# Patient Record
Sex: FEMALE | Race: WHITE | URBAN - METROPOLITAN AREA
[De-identification: names, ages, dates, MRNs, and addresses within clinical notes are randomized per-mention and may not be internally consistent; named-entity substitution may affect disease eponyms.]

---

## 2017-01-10 PROBLEM — K70.31 ASCITES DUE TO ALCOHOLIC CIRRHOSIS (HCC): Status: ACTIVE | Noted: 2017-01-10

## 2017-01-10 PROBLEM — I85.11 SECONDARY ESOPHAGEAL VARICES WITH BLEEDING (HCC): Status: ACTIVE | Noted: 2017-01-10

## 2017-01-10 PROBLEM — K70.31 ALCOHOLIC CIRRHOSIS OF LIVER WITH ASCITES (HCC): Status: ACTIVE | Noted: 2017-01-10

## 2017-04-01 PROCEDURE — 82140 ASSAY OF AMMONIA: CPT | Performed by: SPECIALIST

## 2019-02-12 ENCOUNTER — APPOINTMENT (OUTPATIENT)
Dept: GENERAL RADIOLOGY | Facility: HOSPITAL | Age: 44
DRG: 434 | End: 2019-02-12
Attending: INTERNAL MEDICINE
Payer: COMMERCIAL

## 2019-02-12 ENCOUNTER — HOSPITAL ENCOUNTER (INPATIENT)
Facility: HOSPITAL | Age: 44
LOS: 1 days | Discharge: HOME | DRG: 434 | End: 2019-02-13
Attending: INTERNAL MEDICINE | Admitting: INTERNAL MEDICINE
Payer: COMMERCIAL

## 2019-02-12 PROBLEM — K76.82 HEPATIC ENCEPHALOPATHY (HCC): Status: ACTIVE | Noted: 2019-02-12

## 2019-02-12 PROBLEM — K72.90 HEPATIC ENCEPHALOPATHY (HCC): Status: ACTIVE | Noted: 2019-02-12

## 2019-02-12 LAB
ALBUMIN SERPL-MCNC: 2.4 G/DL (ref 3.4–5)
ALBUMIN/GLOB SERPL: 0.5 {RATIO} (ref 1–2)
ALP LIVER SERPL-CCNC: 101 U/L (ref 37–98)
ALT SERPL-CCNC: 36 U/L (ref 13–56)
AMMONIA PLAS-MCNC: 38 UMOL/L (ref 11–32)
ANION GAP SERPL CALC-SCNC: 11 MMOL/L (ref 0–18)
AST SERPL-CCNC: 56 U/L (ref 15–37)
BASOPHILS # BLD AUTO: 0.04 X10(3) UL (ref 0–0.2)
BASOPHILS NFR BLD AUTO: 0.8 %
BILIRUB SERPL-MCNC: 4.4 MG/DL (ref 0.1–2)
BILIRUB UR QL STRIP.AUTO: NEGATIVE
BUN BLD-MCNC: 15 MG/DL (ref 7–18)
BUN/CREAT SERPL: 18.5 (ref 10–20)
CALCIUM BLD-MCNC: 9.2 MG/DL (ref 8.5–10.1)
CHLORIDE SERPL-SCNC: 115 MMOL/L (ref 98–107)
CLARITY UR REFRACT.AUTO: CLEAR
CO2 SERPL-SCNC: 19 MMOL/L (ref 21–32)
COLOR UR AUTO: YELLOW
CREAT BLD-MCNC: 0.81 MG/DL (ref 0.55–1.02)
DEPRECATED RDW RBC AUTO: 44.4 FL (ref 35.1–46.3)
EOSINOPHIL # BLD AUTO: 0.27 X10(3) UL (ref 0–0.7)
EOSINOPHIL NFR BLD AUTO: 5.2 %
ERYTHROCYTE [DISTWIDTH] IN BLOOD BY AUTOMATED COUNT: 13.1 % (ref 11–15)
GLOBULIN PLAS-MCNC: 4.6 G/DL (ref 2.8–4.4)
GLUCOSE BLD-MCNC: 104 MG/DL (ref 70–99)
GLUCOSE UR STRIP.AUTO-MCNC: NEGATIVE MG/DL
HCT VFR BLD AUTO: 32 % (ref 35–48)
HGB BLD-MCNC: 10.5 G/DL (ref 12–16)
IMM GRANULOCYTES # BLD AUTO: 0.03 X10(3) UL (ref 0–1)
IMM GRANULOCYTES NFR BLD: 0.6 %
INR BLD: 1.67 (ref 0.9–1.1)
KETONES UR STRIP.AUTO-MCNC: NEGATIVE MG/DL
LYMPHOCYTES # BLD AUTO: 1.28 X10(3) UL (ref 1–4)
LYMPHOCYTES NFR BLD AUTO: 24.6 %
M PROTEIN MFR SERPL ELPH: 7 G/DL (ref 6.4–8.2)
MCH RBC QN AUTO: 30.5 PG (ref 26–34)
MCHC RBC AUTO-ENTMCNC: 32.8 G/DL (ref 31–37)
MCV RBC AUTO: 93 FL (ref 80–100)
MONOCYTES # BLD AUTO: 0.44 X10(3) UL (ref 0.1–1)
MONOCYTES NFR BLD AUTO: 8.5 %
NEUTROPHILS # BLD AUTO: 3.14 X10 (3) UL (ref 1.5–7.7)
NEUTROPHILS # BLD AUTO: 3.14 X10(3) UL (ref 1.5–7.7)
NEUTROPHILS NFR BLD AUTO: 60.3 %
NITRITE UR QL STRIP.AUTO: NEGATIVE
OSMOLALITY SERPL CALC.SUM OF ELEC: 301 MOSM/KG (ref 275–295)
PH UR STRIP.AUTO: 6 [PH] (ref 4.5–8)
PLATELET # BLD AUTO: 126 10(3)UL (ref 150–450)
POTASSIUM SERPL-SCNC: 3.7 MMOL/L (ref 3.5–5.1)
PROT UR STRIP.AUTO-MCNC: NEGATIVE MG/DL
PSA SERPL DL<=0.01 NG/ML-MCNC: 20.3 SECONDS (ref 12.4–14.7)
RBC # BLD AUTO: 3.44 X10(6)UL (ref 3.8–5.3)
RBC UR QL AUTO: NEGATIVE
SODIUM SERPL-SCNC: 145 MMOL/L (ref 136–145)
SP GR UR STRIP.AUTO: 1.01 (ref 1–1.03)
UROBILINOGEN UR STRIP.AUTO-MCNC: <2 MG/DL
WBC # BLD AUTO: 5.2 X10(3) UL (ref 4–11)

## 2019-02-12 PROCEDURE — 80053 COMPREHEN METABOLIC PANEL: CPT | Performed by: INTERNAL MEDICINE

## 2019-02-12 PROCEDURE — 87086 URINE CULTURE/COLONY COUNT: CPT | Performed by: INTERNAL MEDICINE

## 2019-02-12 PROCEDURE — 82140 ASSAY OF AMMONIA: CPT | Performed by: INTERNAL MEDICINE

## 2019-02-12 PROCEDURE — 81001 URINALYSIS AUTO W/SCOPE: CPT | Performed by: INTERNAL MEDICINE

## 2019-02-12 PROCEDURE — 85025 COMPLETE CBC W/AUTO DIFF WBC: CPT | Performed by: INTERNAL MEDICINE

## 2019-02-12 PROCEDURE — 85610 PROTHROMBIN TIME: CPT | Performed by: INTERNAL MEDICINE

## 2019-02-12 PROCEDURE — 71045 X-RAY EXAM CHEST 1 VIEW: CPT | Performed by: INTERNAL MEDICINE

## 2019-02-12 RX ORDER — LACTULOSE 10 G/15ML
20 SOLUTION ORAL 2 TIMES DAILY
Status: ON HOLD | COMMUNITY
End: 2019-02-13

## 2019-02-12 RX ORDER — FOLIC ACID 1 MG/1
1 TABLET ORAL
Status: DISCONTINUED | OUTPATIENT
Start: 2019-02-12 | End: 2019-02-13

## 2019-02-12 RX ORDER — ONDANSETRON 2 MG/ML
4 INJECTION INTRAMUSCULAR; INTRAVENOUS EVERY 6 HOURS PRN
Status: DISCONTINUED | OUTPATIENT
Start: 2019-02-12 | End: 2019-02-13

## 2019-02-12 RX ORDER — PANTOPRAZOLE SODIUM 40 MG/1
40 TABLET, DELAYED RELEASE ORAL
Status: DISCONTINUED | OUTPATIENT
Start: 2019-02-12 | End: 2019-02-13

## 2019-02-12 RX ORDER — LACTULOSE 20 G/30ML
20 SOLUTION ORAL 4 TIMES DAILY
Status: DISCONTINUED | OUTPATIENT
Start: 2019-02-12 | End: 2019-02-13

## 2019-02-12 RX ORDER — LACTULOSE 20 G/30ML
20 SOLUTION ORAL 3 TIMES DAILY
Status: DISCONTINUED | OUTPATIENT
Start: 2019-02-12 | End: 2019-02-12

## 2019-02-12 RX ORDER — HEPARIN SODIUM 5000 [USP'U]/ML
5000 INJECTION, SOLUTION INTRAVENOUS; SUBCUTANEOUS EVERY 8 HOURS SCHEDULED
Status: DISCONTINUED | OUTPATIENT
Start: 2019-02-13 | End: 2019-02-13

## 2019-02-12 RX ORDER — FUROSEMIDE 20 MG/1
20 TABLET ORAL
Status: DISCONTINUED | OUTPATIENT
Start: 2019-02-12 | End: 2019-02-13

## 2019-02-12 RX ORDER — METOCLOPRAMIDE HYDROCHLORIDE 5 MG/ML
10 INJECTION INTRAMUSCULAR; INTRAVENOUS EVERY 8 HOURS PRN
Status: DISCONTINUED | OUTPATIENT
Start: 2019-02-12 | End: 2019-02-13

## 2019-02-12 NOTE — H&P
B[facility] Hospitalist H&P       CC: Hepatic encephalopathy     PCP: No primary care provider on file.     History of Present Illness:  Ms. Karsten Hernández is a 38 yo female with PMH of ETOH cirrhosis s/p TIPS 2014 and revision in 2017 who presented as transfer from [facility] E lb (56.7 kg)  04/03/17 : 126 lb (57.2 kg)  01/10/17 : 128 lb (58.1 kg)  11/02/16 : 127 lb 6.4 oz (57.8 kg)  02/04/14 : 120 lb (54.4 kg)    Gen: No acute distress, alert and oriented   HEENT: sclera icterus, oral mucosa moist  Pulm: Lungs clear bilaterally, Hospitalist

## 2019-02-13 VITALS
WEIGHT: 115 LBS | TEMPERATURE: 98 F | OXYGEN SATURATION: 100 % | HEART RATE: 100 BPM | BODY MASS INDEX: 18 KG/M2 | DIASTOLIC BLOOD PRESSURE: 67 MMHG | RESPIRATION RATE: 16 BRPM | SYSTOLIC BLOOD PRESSURE: 125 MMHG

## 2019-02-13 LAB
ALBUMIN SERPL-MCNC: 2.1 G/DL (ref 3.4–5)
ALBUMIN/GLOB SERPL: 0.5 {RATIO} (ref 1–2)
ALP LIVER SERPL-CCNC: 92 U/L (ref 37–98)
ALT SERPL-CCNC: 34 U/L (ref 13–56)
ANION GAP SERPL CALC-SCNC: 8 MMOL/L (ref 0–18)
AST SERPL-CCNC: 51 U/L (ref 15–37)
BASOPHILS # BLD AUTO: 0.04 X10(3) UL (ref 0–0.2)
BASOPHILS NFR BLD AUTO: 0.7 %
BILIRUB SERPL-MCNC: 4.1 MG/DL (ref 0.1–2)
BUN BLD-MCNC: 13 MG/DL (ref 7–18)
BUN/CREAT SERPL: 17.8 (ref 10–20)
CALCIUM BLD-MCNC: 8.8 MG/DL (ref 8.5–10.1)
CHLORIDE SERPL-SCNC: 115 MMOL/L (ref 98–107)
CO2 SERPL-SCNC: 20 MMOL/L (ref 21–32)
CREAT BLD-MCNC: 0.73 MG/DL (ref 0.55–1.02)
DEPRECATED RDW RBC AUTO: 43.8 FL (ref 35.1–46.3)
EOSINOPHIL # BLD AUTO: 0.21 X10(3) UL (ref 0–0.7)
EOSINOPHIL NFR BLD AUTO: 3.8 %
ERYTHROCYTE [DISTWIDTH] IN BLOOD BY AUTOMATED COUNT: 13.1 % (ref 11–15)
GLOBULIN PLAS-MCNC: 4 G/DL (ref 2.8–4.4)
GLUCOSE BLD-MCNC: 90 MG/DL (ref 70–99)
HCT VFR BLD AUTO: 29.3 % (ref 35–48)
HGB BLD-MCNC: 10 G/DL (ref 12–16)
IMM GRANULOCYTES # BLD AUTO: 0.02 X10(3) UL (ref 0–1)
IMM GRANULOCYTES NFR BLD: 0.4 %
LYMPHOCYTES # BLD AUTO: 1.84 X10(3) UL (ref 1–4)
LYMPHOCYTES NFR BLD AUTO: 33 %
M PROTEIN MFR SERPL ELPH: 6.1 G/DL (ref 6.4–8.2)
MCH RBC QN AUTO: 31.4 PG (ref 26–34)
MCHC RBC AUTO-ENTMCNC: 34.1 G/DL (ref 31–37)
MCV RBC AUTO: 92.1 FL (ref 80–100)
MONOCYTES # BLD AUTO: 0.76 X10(3) UL (ref 0.1–1)
MONOCYTES NFR BLD AUTO: 13.6 %
NEUTROPHILS # BLD AUTO: 2.7 X10 (3) UL (ref 1.5–7.7)
NEUTROPHILS # BLD AUTO: 2.7 X10(3) UL (ref 1.5–7.7)
NEUTROPHILS NFR BLD AUTO: 48.5 %
OSMOLALITY SERPL CALC.SUM OF ELEC: 296 MOSM/KG (ref 275–295)
PLATELET # BLD AUTO: 104 10(3)UL (ref 150–450)
POTASSIUM SERPL-SCNC: 3.5 MMOL/L (ref 3.5–5.1)
POTASSIUM SERPL-SCNC: 3.9 MMOL/L (ref 3.5–5.1)
RBC # BLD AUTO: 3.18 X10(6)UL (ref 3.8–5.3)
SODIUM SERPL-SCNC: 143 MMOL/L (ref 136–145)
WBC # BLD AUTO: 5.6 X10(3) UL (ref 4–11)

## 2019-02-13 PROCEDURE — 85025 COMPLETE CBC W/AUTO DIFF WBC: CPT | Performed by: INTERNAL MEDICINE

## 2019-02-13 PROCEDURE — 80053 COMPREHEN METABOLIC PANEL: CPT | Performed by: INTERNAL MEDICINE

## 2019-02-13 PROCEDURE — 84132 ASSAY OF SERUM POTASSIUM: CPT | Performed by: INTERNAL MEDICINE

## 2019-02-13 RX ORDER — LACTULOSE 10 G/15ML
20 SOLUTION ORAL 2 TIMES DAILY
Qty: 1892 ML | Refills: 0 | Status: SHIPPED | OUTPATIENT
Start: 2019-02-13 | End: 2019-02-13

## 2019-02-13 RX ORDER — POTASSIUM CHLORIDE 20 MEQ/1
40 TABLET, EXTENDED RELEASE ORAL EVERY 4 HOURS
Status: COMPLETED | OUTPATIENT
Start: 2019-02-13 | End: 2019-02-13

## 2019-02-13 RX ORDER — LACTULOSE 10 G/15ML
20 SOLUTION ORAL 2 TIMES DAILY
Qty: 1892 ML | Refills: 0 | Status: SHIPPED | OUTPATIENT
Start: 2019-02-13 | End: 2019-09-30

## 2019-02-13 NOTE — PLAN OF CARE
Patient/Family Goals    • Patient/Family Long Term Goal Progressing    • Patient/Family Short Term Goal Progressing        Transferred from [facility] due to being out of network. A/O x 4. No confusion noted. RA.  NSR  tele. Electrolyte protocol. Jaundice.  L

## 2019-02-13 NOTE — PLAN OF CARE
A/O x4. VSS. Room air.   No confusion or complaints  Jaundice noted  On lactulose QID  Restarted on Rifaxamin  Electrolyte protocol  Denies pain  Needs met at this time  Will continue to monitor    GASTROINTESTINAL - ADULT    • Minimal or absence of nausea

## 2019-02-13 NOTE — CM/SW NOTE
02/13/19 1300   CM/SW Referral Data   Referral Source Physician;Social Work (self-referral)   Reason for Referral Discharge planning   Informant Patient   Patient Info   Patient's Mental Status Alert;Oriented       MSW spoke to patient, she denies any i

## 2019-02-13 NOTE — CONSULTS
Referring Provider  None Pcp    Chief Complaint: No chief complaint on file. HPI:   Bob Rubio is a 39year old female, with h/o alcoholic cirrhosis, Current MELD 18,. Tells me she has been sober, but her AST level remains elevated.  She tells me that (52.2 kg)   LMP 07/12/2018   SpO2 100%   BMI 17.75 kg/m²     GENERAL: Thinly built, poor nutrition. SKIN: no rashes,no suspicious lesions  HEENT: atraumatic, normocephalic. NECK: supple,no adenopathy,no bruits  LUNGS: Chest wall movements are normal B/L.

## 2019-02-13 NOTE — DISCHARGE SUMMARY
General Medicine Discharge Summary     Patient ID:  Bijan Palomino  39year old  8/7/1975    Admit date: 2/12/2019    Discharge date and time: 2/13/19    Attending Physician: Rocky Hernandez MD     Primary Care Physician: None Pcp     Reason for admission daily.    rifaximin (XIFAXAN) 550 MG Oral Tab  Take 1 tablet (550 mg total) by mouth 2 (two) times daily. CONTINUE these medications which have NOT CHANGED    folic acid 1 MG Oral Tab  Take 1 tablet (1 mg total) by mouth once daily.     PANTOPRAZOLE SO

## 2019-02-13 NOTE — CM/SW NOTE
Informed by patient's nurse that patient is requesting any coupons for her rifaximin--information on instant savings card to be activated or downloaded given to pt as well as draft letter for her physician to send to her insurance on behalf of medical vinicio

## 2019-02-13 NOTE — PAYOR COMM NOTE
--------------  ADMISSION REVIEW     Payor: [employer] ALBA/LAINEY  Subscriber #:  [id number]  Authorization Number: [id number]    Admit date: 2/12/19  Admit time: 46       Admitting Physician: Stefany Fonseca MD  Attending Physician:  Stefany Fonseca MD 29.3    RBC 3.18      02/13/19 0139 97.9 °F (36.6 °C) 100 16 125/67 100 % — None (Room air) — AR   02/12/19 2033 97.9 °F (36.6 °C) 97 16 145/86 — — — — AR     GI CONSULT PENDING      PLEASE PROVIDE INPATIENT AUTHORIZATION. THANK YOU.

## 2019-02-14 NOTE — PROGRESS NOTES
NURSING DISCHARGE NOTE    Discharged Home via Ambulatory. Accompanied by Friend  Belongings Taken by patient/family. Pt assessed and ready for discharge. Instructions and medications reviewed with patient.   Paper prescriptions given per pt request

## 2020-02-11 PROCEDURE — 88342 IMHCHEM/IMCYTCHM 1ST ANTB: CPT | Performed by: OBSTETRICS & GYNECOLOGY

## 2020-02-11 PROCEDURE — 88305 TISSUE EXAM BY PATHOLOGIST: CPT | Performed by: OBSTETRICS & GYNECOLOGY

## 2022-10-26 ENCOUNTER — TELEPHONE (OUTPATIENT)
Dept: SCHEDULING | Age: 47
End: 2022-10-26

## 2025-02-26 ENCOUNTER — LABORATORY ENCOUNTER (OUTPATIENT)
Dept: LAB | Age: 50
End: 2025-02-26
Attending: ORTHOPAEDIC SURGERY
Payer: COMMERCIAL

## 2025-02-26 DIAGNOSIS — Z01.818 PRE-OP TESTING: ICD-10-CM

## 2025-02-26 LAB
ALBUMIN SERPL-MCNC: 3.5 G/DL (ref 3.2–4.8)
ALBUMIN/GLOB SERPL: 0.9 {RATIO} (ref 1–2)
ALP LIVER SERPL-CCNC: 86 U/L
ALT SERPL-CCNC: 33 U/L
ANION GAP SERPL CALC-SCNC: 9 MMOL/L (ref 0–18)
AST SERPL-CCNC: 59 U/L (ref ?–34)
BILIRUB SERPL-MCNC: 3.1 MG/DL (ref 0.3–1.2)
BUN BLD-MCNC: 12 MG/DL (ref 9–23)
BUN/CREAT SERPL: 14.1 (ref 10–20)
CALCIUM BLD-MCNC: 9.6 MG/DL (ref 8.7–10.4)
CHLORIDE SERPL-SCNC: 106 MMOL/L (ref 98–112)
CO2 SERPL-SCNC: 24 MMOL/L (ref 21–32)
CREAT BLD-MCNC: 0.85 MG/DL
EGFRCR SERPLBLD CKD-EPI 2021: 83 ML/MIN/1.73M2 (ref 60–?)
FASTING STATUS PATIENT QL REPORTED: YES
GLOBULIN PLAS-MCNC: 3.7 G/DL (ref 2–3.5)
GLUCOSE BLD-MCNC: 100 MG/DL (ref 70–99)
OSMOLALITY SERPL CALC.SUM OF ELEC: 288 MOSM/KG (ref 275–295)
POTASSIUM SERPL-SCNC: 4 MMOL/L (ref 3.5–5.1)
PROT SERPL-MCNC: 7.2 G/DL (ref 5.7–8.2)
SODIUM SERPL-SCNC: 139 MMOL/L (ref 136–145)

## 2025-02-26 PROCEDURE — 80053 COMPREHEN METABOLIC PANEL: CPT

## 2025-02-26 PROCEDURE — 36415 COLL VENOUS BLD VENIPUNCTURE: CPT

## 2025-02-26 RX ORDER — GABAPENTIN 300 MG/1
300 CAPSULE ORAL AS NEEDED
COMMUNITY

## 2025-02-26 RX ORDER — HYDROCODONE BITARTRATE AND ACETAMINOPHEN 5; 325 MG/1; MG/1
1 TABLET ORAL EVERY 6 HOURS PRN
Status: ON HOLD | COMMUNITY
End: 2025-02-28

## 2025-02-26 RX ORDER — SPIRONOLACTONE 25 MG/1
50 TABLET ORAL DAILY
COMMUNITY

## 2025-02-28 ENCOUNTER — APPOINTMENT (OUTPATIENT)
Dept: GENERAL RADIOLOGY | Facility: HOSPITAL | Age: 50
End: 2025-02-28
Attending: ORTHOPAEDIC SURGERY
Payer: COMMERCIAL

## 2025-02-28 ENCOUNTER — ANESTHESIA (OUTPATIENT)
Dept: SURGERY | Facility: HOSPITAL | Age: 50
End: 2025-02-28
Payer: COMMERCIAL

## 2025-02-28 ENCOUNTER — HOSPITAL ENCOUNTER (OUTPATIENT)
Facility: HOSPITAL | Age: 50
Setting detail: HOSPITAL OUTPATIENT SURGERY
Discharge: HOME | End: 2025-02-28
Attending: ORTHOPAEDIC SURGERY | Admitting: ORTHOPAEDIC SURGERY
Payer: COMMERCIAL

## 2025-02-28 ENCOUNTER — ANESTHESIA EVENT (OUTPATIENT)
Dept: SURGERY | Facility: HOSPITAL | Age: 50
End: 2025-02-28
Payer: COMMERCIAL

## 2025-02-28 VITALS
HEIGHT: 67 IN | SYSTOLIC BLOOD PRESSURE: 124 MMHG | HEART RATE: 91 BPM | RESPIRATION RATE: 18 BRPM | DIASTOLIC BLOOD PRESSURE: 75 MMHG | OXYGEN SATURATION: 100 % | TEMPERATURE: 98 F | BODY MASS INDEX: 20.56 KG/M2 | WEIGHT: 131 LBS

## 2025-02-28 DIAGNOSIS — S52.531A CLOSED COLLES' FRACTURE OF RIGHT RADIUS, INITIAL ENCOUNTER: ICD-10-CM

## 2025-02-28 DIAGNOSIS — Z01.818 PRE-OP TESTING: Primary | ICD-10-CM

## 2025-02-28 LAB
INR BLD: 1.42 (ref 0.8–1.2)
PROTHROMBIN TIME: 17.5 SECONDS (ref 11.6–14.8)

## 2025-02-28 PROCEDURE — 76942 ECHO GUIDE FOR BIOPSY: CPT | Performed by: ANESTHESIOLOGY

## 2025-02-28 PROCEDURE — 85610 PROTHROMBIN TIME: CPT | Performed by: ANESTHESIOLOGY

## 2025-02-28 PROCEDURE — 76000 FLUOROSCOPY <1 HR PHYS/QHP: CPT | Performed by: ORTHOPAEDIC SURGERY

## 2025-02-28 PROCEDURE — 0PSH04Z REPOSITION RIGHT RADIUS WITH INTERNAL FIXATION DEVICE, OPEN APPROACH: ICD-10-PCS | Performed by: ORTHOPAEDIC SURGERY

## 2025-02-28 DEVICE — IMPLANTABLE DEVICE: Type: IMPLANTABLE DEVICE | Site: RADIUS | Status: FUNCTIONAL

## 2025-02-28 DEVICE — SCREW BNE 3.5X11MM CORT DST VOLAR RAD: Type: IMPLANTABLE DEVICE | Site: RADIUS | Status: FUNCTIONAL

## 2025-02-28 DEVICE — PLATE BNE 3 H R DST VOLAR RAD TI NAR GEMINUS: Type: IMPLANTABLE DEVICE | Site: RADIUS | Status: FUNCTIONAL

## 2025-02-28 DEVICE — PEG BNE FIX 2X18MM DST RAD TI OPT 2 LOK: Type: IMPLANTABLE DEVICE | Site: RADIUS | Status: FUNCTIONAL

## 2025-02-28 DEVICE — K WIRE 1.5X127MM DST RAD SYS GEMINUS: Type: IMPLANTABLE DEVICE | Site: RADIUS

## 2025-02-28 DEVICE — PEG BNE FIX 2X20MM DST RAD TI SMTH: Type: IMPLANTABLE DEVICE | Site: RADIUS | Status: FUNCTIONAL

## 2025-02-28 RX ORDER — MIDAZOLAM HYDROCHLORIDE 1 MG/ML
1 INJECTION INTRAMUSCULAR; INTRAVENOUS EVERY 5 MIN PRN
Status: DISCONTINUED | OUTPATIENT
Start: 2025-02-28 | End: 2025-02-28

## 2025-02-28 RX ORDER — HYDROCODONE BITARTRATE AND ACETAMINOPHEN 5; 325 MG/1; MG/1
1 TABLET ORAL ONCE AS NEEDED
Status: DISCONTINUED | OUTPATIENT
Start: 2025-02-28 | End: 2025-02-28

## 2025-02-28 RX ORDER — NALOXONE HYDROCHLORIDE 0.4 MG/ML
80 INJECTION, SOLUTION INTRAMUSCULAR; INTRAVENOUS; SUBCUTANEOUS AS NEEDED
Status: DISCONTINUED | OUTPATIENT
Start: 2025-02-28 | End: 2025-02-28

## 2025-02-28 RX ORDER — HYDROCODONE BITARTRATE AND ACETAMINOPHEN 5; 325 MG/1; MG/1
1 TABLET ORAL EVERY 6 HOURS PRN
Qty: 20 TABLET | Refills: 0 | Status: SHIPPED | OUTPATIENT
Start: 2025-02-28

## 2025-02-28 RX ORDER — SCOPOLAMINE 1 MG/3D
1 PATCH, EXTENDED RELEASE TRANSDERMAL ONCE
Status: DISCONTINUED | OUTPATIENT
Start: 2025-02-28 | End: 2025-02-28 | Stop reason: HOSPADM

## 2025-02-28 RX ORDER — ACETAMINOPHEN 500 MG
1000 TABLET ORAL ONCE AS NEEDED
Status: DISCONTINUED | OUTPATIENT
Start: 2025-02-28 | End: 2025-02-28

## 2025-02-28 RX ORDER — MIDAZOLAM HYDROCHLORIDE 1 MG/ML
INJECTION INTRAMUSCULAR; INTRAVENOUS AS NEEDED
Status: DISCONTINUED | OUTPATIENT
Start: 2025-02-28 | End: 2025-02-28 | Stop reason: SURG

## 2025-02-28 RX ORDER — HYDROMORPHONE HYDROCHLORIDE 1 MG/ML
0.2 INJECTION, SOLUTION INTRAMUSCULAR; INTRAVENOUS; SUBCUTANEOUS EVERY 5 MIN PRN
Status: DISCONTINUED | OUTPATIENT
Start: 2025-02-28 | End: 2025-02-28

## 2025-02-28 RX ORDER — HYDROMORPHONE HYDROCHLORIDE 1 MG/ML
0.4 INJECTION, SOLUTION INTRAMUSCULAR; INTRAVENOUS; SUBCUTANEOUS EVERY 5 MIN PRN
Status: DISCONTINUED | OUTPATIENT
Start: 2025-02-28 | End: 2025-02-28

## 2025-02-28 RX ORDER — SODIUM CHLORIDE, SODIUM LACTATE, POTASSIUM CHLORIDE, CALCIUM CHLORIDE 600; 310; 30; 20 MG/100ML; MG/100ML; MG/100ML; MG/100ML
INJECTION, SOLUTION INTRAVENOUS CONTINUOUS
Status: DISCONTINUED | OUTPATIENT
Start: 2025-02-28 | End: 2025-02-28

## 2025-02-28 RX ORDER — HYDROMORPHONE HYDROCHLORIDE 1 MG/ML
0.6 INJECTION, SOLUTION INTRAMUSCULAR; INTRAVENOUS; SUBCUTANEOUS EVERY 5 MIN PRN
Status: DISCONTINUED | OUTPATIENT
Start: 2025-02-28 | End: 2025-02-28

## 2025-02-28 RX ORDER — ONDANSETRON 2 MG/ML
INJECTION INTRAMUSCULAR; INTRAVENOUS AS NEEDED
Status: DISCONTINUED | OUTPATIENT
Start: 2025-02-28 | End: 2025-02-28 | Stop reason: SURG

## 2025-02-28 RX ORDER — DEXAMETHASONE SODIUM PHOSPHATE 4 MG/ML
VIAL (ML) INJECTION AS NEEDED
Status: DISCONTINUED | OUTPATIENT
Start: 2025-02-28 | End: 2025-02-28 | Stop reason: SURG

## 2025-02-28 RX ORDER — HYDROCODONE BITARTRATE AND ACETAMINOPHEN 5; 325 MG/1; MG/1
2 TABLET ORAL ONCE AS NEEDED
Status: DISCONTINUED | OUTPATIENT
Start: 2025-02-28 | End: 2025-02-28

## 2025-02-28 RX ORDER — LIDOCAINE HYDROCHLORIDE 10 MG/ML
INJECTION, SOLUTION EPIDURAL; INFILTRATION; INTRACAUDAL; PERINEURAL AS NEEDED
Status: DISCONTINUED | OUTPATIENT
Start: 2025-02-28 | End: 2025-02-28 | Stop reason: SURG

## 2025-02-28 RX ORDER — ONDANSETRON 2 MG/ML
4 INJECTION INTRAMUSCULAR; INTRAVENOUS EVERY 6 HOURS PRN
Status: DISCONTINUED | OUTPATIENT
Start: 2025-02-28 | End: 2025-02-28

## 2025-02-28 RX ORDER — PROCHLORPERAZINE EDISYLATE 5 MG/ML
5 INJECTION INTRAMUSCULAR; INTRAVENOUS EVERY 8 HOURS PRN
Status: DISCONTINUED | OUTPATIENT
Start: 2025-02-28 | End: 2025-02-28

## 2025-02-28 RX ADMIN — MIDAZOLAM HYDROCHLORIDE 2 MG: 1 INJECTION INTRAMUSCULAR; INTRAVENOUS at 15:37:00

## 2025-02-28 RX ADMIN — DEXAMETHASONE SODIUM PHOSPHATE 2 MG: 4 MG/ML VIAL (ML) INJECTION at 15:46:00

## 2025-02-28 RX ADMIN — LIDOCAINE HYDROCHLORIDE 100 MG: 10 INJECTION, SOLUTION EPIDURAL; INFILTRATION; INTRACAUDAL; PERINEURAL at 15:40:00

## 2025-02-28 RX ADMIN — DEXAMETHASONE SODIUM PHOSPHATE 8 MG: 4 MG/ML VIAL (ML) INJECTION at 15:52:00

## 2025-02-28 RX ADMIN — ONDANSETRON 4 MG: 2 INJECTION INTRAMUSCULAR; INTRAVENOUS at 15:52:00

## 2025-02-28 RX ADMIN — SODIUM CHLORIDE, SODIUM LACTATE, POTASSIUM CHLORIDE, CALCIUM CHLORIDE: 600; 310; 30; 20 INJECTION, SOLUTION INTRAVENOUS at 17:18:00

## 2025-02-28 NOTE — ANESTHESIA POSTPROCEDURE EVALUATION
[facility]    Chloe Morejon Patient Status:  Hospital Outpatient Surgery   Age/Gender 49 year old female MRN [id number]   Location [facility] SURGERY Attending Kenneth Seymour MD   Hosp Day # 0 PCP Glo Glover MD       Anesthesia Post-op Note    OPEN REDUCTION INTERNAL FIXATION RIGHT DISTAL RADIUS    Procedure Summary       Date: 02/28/25 Room / Location:  MAIN OR [address] [place] [address] MAIN OR    Anesthesia Start: 1537 Anesthesia Stop: 1718    Procedure: OPEN REDUCTION INTERNAL FIXATION RIGHT DISTAL RADIUS (Right: Lower Arm) Diagnosis: (OTHER CLOSED EXTRA-ARTICULAR FRACTURE OF DISTAL END OF RIGHT RADIUS, INITIAL ENCOUNTER)    Surgeons: Kenneth Seymour MD Anesthesiologist: Myerson, David, MD    Anesthesia Type: general ASA Status: 3            Anesthesia Type: general    Vitals Value Taken Time   /70 02/28/25 1718   Temp 98.9 °F (37.2 °C) 02/28/25 1718   Pulse 103 02/28/25 1718   Resp 16 02/28/25 1718   SpO2 98 % 02/28/25 1718           Patient Location: PACU    Anesthesia Type: general    Airway Patency: patent and extubated    Postop Pain Control: adequate    Mental Status: preanesthetic baseline    Nausea/Vomiting: none    Cardiopulmonary/Hydration status: stable euvolemic    Complications: no apparent anesthesia related complications    Postop vital signs: stable    Dental Exam: Unchanged from Preop    Patient to be discharged from PACU when criteria met.

## 2025-02-28 NOTE — BRIEF OP NOTE
Pre-Operative Diagnosis: OTHER CLOSED EXTRA-ARTICULAR FRACTURE OF DISTAL END OF RIGHT RADIUS, INITIAL ENCOUNTER     Post-Operative Diagnosis: OTHER CLOSED EXTRA-ARTICULAR FRACTURE OF DISTAL END OF RIGHT RADIUS, INITIAL ENCOUNTER      Procedure Performed:   OPEN REDUCTION INTERNAL FIXATION RIGHT DISTAL RADIUS    Surgeons and Role:     * Kenneth Seymour MD - Primary    Assistant(s):  PA: Liane John PA-C     Surgical Findings:       Specimen: NONE     Estimated Blood Loss: Blood Output: 5 mL (2/28/2025  4:48 PM)      Dictation Number:       Kenneth Seymour MD  2/28/2025  4:59 PM

## 2025-02-28 NOTE — H&P
Office Visit  2/25/2025  Orthopaedics - Belkis Ireland, Naomi Cheng MD  Surgery, Orthopaedic Other closed extra-articular fracture of distal end of right radius, initial encounter  Dx Right Wrist - Pain; Referred by Naomi Knowles MD  Reason for Visit     Reason for Visit    Right Wrist - Pain      H&P  Naomi Knowles MD (Physician)  Surgery, Orthopaedic  February 25, 2025     Chloe oMrejon     Chief Complaint: Right distal radius fracture  DOI: 2/6/2025     History of Present Illness:   Ms. Morejon is a 49 year old year old Right  hand -dominant, female, with a history of right distal radius fracture. This began 2/6/2025 after a fall on ice.  She presented to the urgent care that day, she was sent to an emergency room where they did a weighted reduction of the right wrist, and referred her for orthopedic follow-up.  She saw a [facility] orthopedic surgeon who recommended open reduction internal fixation of the right distal radius and was scheduled for surgery for 2/21/2025 unfortunately immediately prior to surgery she was found that he was out of network and the surgery was canceled presenting today in a delayed fashion for evaluation and surgical consultation.  Prior treatment for this includes closed reduction and splinting by the emergency room on 2/6/2025. She does not have a history of injury, surgery, or treatment to this wrist, before the current injury.  They describe their symptoms as pain with any sort of range of motion.  They describe the pain as a dull throbbing, and occur daily.  The symptoms are alleviated with immobilization and elevation, and worsened with movement.  The pain does notradiates. They endorse occasional numbness and tingling to the right upper extremity.      Please see full Pre-operative H&P completed by Glo Glover MD on 2/13/2025     Physical Exam:   Measurement of height, weight, and blood pressure record and noted in the chart.  On physical  examination, the patient is well-developed, and in no acute distress.  HEENT: Normocephalic, atraumatic: Eyes sclera clear, lids are unremarkable.  Cardiovascular: Capillary refill of the extremity was less than 2 seconds.  Pulses were palpable.  Respiratory: Patient has unlabored breathing, with no audible wheezing or coughing.  Lymphatic: Inspection of the arm, hand, reveals no lymphedema and palpation of epitrochlear nodes is unremarkable  Skin: Inspection of the skin reveals no breaks, or obvious lesions except for those noted above.  Psychological: Throughout the examination the patient was noted to be relaxed, with appropriate response to pain, and presents is awake alert and oriented.     Right Upper Extremity:   In well padded volar resting splint. All prominences are padded, no evidence of an cast sores  Splint removed in clinic today  Skin is intact, no erythema, warmth, superficial abrasions or lacerations  There is significant ecchymosis with visible deformity about the wrist, the ecchymosis is in variable stages of healing and present throughout the distal radius, distal forearm, dorsal forearm  Able to form a full composite fist  Wrist ROM deferred 2/2 known distal radius fracture  AIN, PIN, and ulnar nerves are motor intact distally  Sensation is intact to light touch in the radial, median, and ulnar nerve distributions  Fingers are warm and well perfused, capillary refill is 2 seconds       Imaging:   Radiographic Impression: Right intra-articular distal radius fracture with 25 degrees dorsal angulation, no clear associated ulnar styloid fracture, significant loss of radial height     AP, Oblique, and lateral views of the right wrist were ordered, performed, and independently interpreted in clinic today. These demonstrate a Right intra-articular distal radius fracture with 25 degrees dorsal angulation, no clear associated ulnar styloid fracture, significant loss of radial height. No other fractures  or dislocations. The soft tissue shadows are without  foreign bodies present.         Assessment:   Ms. Morejon is a 49 year old female with a right distal radius fracture in unacceptable alignment for continued closed management     Plan:   We discussed the natural history and prognosis of the above condition.  I discussed that in a 49 year old female, a 25 degree dorsal angulation of the distal radius fracture is not acceptable alignment.  I discussed treatment options moving forward including in clinic attempted reduction with cast placement, observation in the current position, and surgical intervention in the form of open reduction internal fixation.  At 4 weeks postoperatively in clinic attempted reduction with cast placement is not an option as there is significant callus formation visible on x-ray and would be ineffective at reducing this fracture. I discussed that observation and allowing the fracture to heal in this position would likely give her a significant loss of flexion moving forward as well as likely significant ulnar-sided wrist pain given significant loss of radial height, and it would not be in a functional range of motion.  Finally surgical intervention would most reliably correct the dorsal angulation and allow her early active range of motion.  I discussed that bones heal in 6 to 8 weeks regardless of whether we do surgery or not, surgery only puts him in a good position to heal.  Unfortunately as this is already 4 weeks out, and given her past medical history including bleeding esophageal varices she needs to perform the hospital, I do not have hospital time within the next week.  I discussed with my colleague Dr. Kenneth Seymour who has operative time available at the hospital this Thursday and Friday and would be able to add her on for open reduction internal fixation of the right distal radius.  She elected to proceed with open reduction internal fixation after thorough discussion of risks  and benefits of all treatment options.     I discussed the surgical options with the patient including open reduction internal fixation of the left distal radius.  We discussed the risks and benefits of operative intervention.  These risks include but are not limited to, infection, wound dehiscence, nerve injury, vessel injury, decreased function or range of motion, malunion, nonunion, hardware failure pain, adhesions, complex regional pain syndrome, recurrent symptoms, need for additional surgery, any unforeseen complications such as anesthesia complications.  The patient understood these risks and wished to proceed.  The patient was scheduled for surgery at this time she will follow-up after surgery.           Naomi Knowles MD  [facility]  Orthopaedic Surgery  Hand and Upper Extremity Surgery

## 2025-02-28 NOTE — ANESTHESIA PROCEDURE NOTES
Airway  Date/Time: 2/28/2025 3:42 PM  Urgency: elective      General Information and Staff    Patient location during procedure: OR  Anesthesiologist: Myerson, David, MD  Performed: anesthesiologist   Performed by: Myerson, David, MD  Authorized by: Myerson, David, MD      Indications and Patient Condition  Indications for airway management: anesthesia  Sedation level: deep  Preoxygenated: yes  Patient position: sniffing  Mask difficulty assessment: 1 - vent by mask    Final Airway Details  Final airway type: supraglottic airway      Successful airway: classic  Size 3       Number of attempts at approach: 1

## 2025-02-28 NOTE — OPERATIVE REPORT
[facility]    PATIENT'S NAME: TALI CASTANEDA   ATTENDING PHYSICIAN: Kenneth Seymour M.D.   OPERATING PHYSICIAN: Kenneth Seymour M.D.   PATIENT ACCOUNT#:   [id number]    LOCATION:  [facility]  MEDICAL RECORD #:   [id number]       YOB: 1975  ADMISSION DATE:       02/28/2025      OPERATION DATE:  02/28/2025    OPERATIVE REPORT      PREOPERATIVE DIAGNOSIS:  Comminuted distal radius fracture.  POSTOPERATIVE DIAGNOSIS:  Comminuted distal radius fracture.  PROCEDURE:  Open reduction, internal fixation, right distal radius fracture.     ASSISTANT:  Liane John PA-C.    ANESTHESIA:  General with a preoperative supraclavicular block.    INDICATIONS:  The patient is over 4 weeks out from an extra-articular distal radius fracture.  This is a far distal fracture with significant dorsal comminution.    OPERATIVE TECHNIQUE:  After induction of anesthesia, the arm was prepped and draped in sterile fashion.  Esmarch exsanguination performed.  Tourniquet elevated to 250 mmHg.  A volar Mir approach was used.  It is already partially eroding the flexor pollicis longus at the fracture edge.  The scarred area was reflected ulnarly after it was freed up.  The pronator quadratus was reflected ulnarly.  The brachioradialis was released distally.  A lobster claw was used to help rotate the shaft out so that I was able to see the posterior fragments.  I was able to try to release the periosteum to allow us to get better correction of the dorsal tilt.  This was a far distal fracture with a lot of comminution of the dorsal fragments.  We were able to get this in a much better but not perfect position on the lateral view and near anatomic on the AP view.  Provisional pin fixation was used as I held the reduction and Ms. John passed the K-wire through the radial styloid across the shaft proximally.  The [employer] Geminus narrow plate was positioned and provisionally pinned.  The  shaft screw in the oval hole was placed first and then a combination of the high mobility compression screws followed by the locking pegs were used in both the radial and ulnar head of the plate.  The final 2 remaining pegs were placed after removing the K-wires.  The 2 remaining 3.5 mm shaft screws were placed also.  Final x-rays were taken.  I put a shorter screw for the ulnar side where it appeared to be going very close to the sigmoid notch.  The shorter screw for sure does not go into the sigmoid notch.  AP, lateral, oblique lateral and axillary views showed good placement of the hardware.  The patient was copiously irrigated.  The pronator was repaired with 3-0 Vicryl.  Tourniquet was let down.  Any bleeding encountered was coagulated with the bipolar cautery.  Closure of the skin was with 3-0 Vicryl and 4-0 nylon.  Dressings and a volar splint were applied.  She was placed in a sling and taken to the postanesthesia recovery room in stable condition.  No complications.  The flexor pollicis longus again had less than 10% area of fraying which was trimmed.  Blood loss was minimal.    Dictated By Kenneth Seymour M.D.  d: 02/28/2025 17:12:26  t: 02/28/2025 17:17:25  Job [id number]  [facility]/

## 2025-02-28 NOTE — ANESTHESIA PROCEDURE NOTES
Regional Block    Date/Time: 2/28/2025 3:44 PM    Performed by: Myerson, David, MD  Authorized by: Myerson, David, MD      General Information and Staff    Start Time:  2/28/2025 3:44 PM  End Time:  2/28/2025 3:46 PM  Anesthesiologist:  Myerson, David, MD  Performed by:  Anesthesiologist  Patient Location:  OR    Block Placement: Post Induction  Site Identification: real time ultrasound guided and image stored and retrievable    Block site/laterality marked before start: site marked  Reason for Block: at surgeon's request and post-op pain management    Preanesthetic Checklist: 2 patient identifers, IV checked, risks and benefits discussed, monitors and equipment checked, pre-op evaluation, timeout performed, anesthesia consent, sterile technique used, no prohibitive neurological deficits and no local skin infection at insertion site      Procedure Details    Patient Position:   Prep: ChloraPrep    Monitoring:  Cardiac monitor, continuous pulse ox, blood pressure cuff and heart rate  Block Type:  Axillary  Laterality:  Right  Injection Technique:  Single-shot    Needle    Needle Type:  Short-bevel and echogenic  Needle Gauge:  21 G  Needle Length:  50 mm  Needle Localization:  Ultrasound guidance  Reason for Ultrasound Use: appropriate spread of the medication was noted in real time and no ultrasound evidence of intravascular and/or intraneural injection            Assessment    Injection Assessment:  Good spread noted, negative resistance, negative aspiration for heme, incremental injection and low pressure  Heart Rate Change: No    - Patient tolerated block procedure well without evidence of immediate block related complications.     Medications  2/28/2025 3:44 PM      Additional Comments    Medication:  Ropivacaine 0.375% 30mL + dexa 2mg

## 2025-02-28 NOTE — DISCHARGE INSTRUCTIONS
Home Care Instructions Following Your Fracture Repair     Chloe-  We hope you were pleased with your care at [facility].  We wish you the best outcome and overall experience with your operation.  These instructions will help to minimize pain, limit the risk of infection, and improve the likelihood of a successful result.    Splint  Your splint will help protect the repair and healing of your fracture.  Keep your splint on at all times  Do not remove your splint  Do not get your splint wet.  Keep your splint clean and dry.  Wear splint when sleeping.    Cold Therapy  Cold therapy will help minimize swelling, improve, comfort, and limit the need for pain medication.  Apply a bag of ice wrapped in a towel for 20 minutes three times daily  An ice bag should never come in direct contact with the skin.  Immediately contact Dr. Seymour, if you experience excruciating pain not helped by pain medication, burning, blisters, redness, discoloration, or other changes in skin appearance    Hand Elevation  Strict hand elevation will help to minimize pain, improve healing, and decrease the risk of infection.  Keep hand elevated on two pillows when sitting or lying  Maintain hand above the level of your heart as much as possible  Keep hand at shoulder level during the day.  If your hand starts to throb, hurt, and swell, it might be a sign that you are not elevating it.    Pain Medication: Norco or Tylenol #3  Dr. Seymour will prescribe either Norco or Tylenol #3 for your pain.  Norco or Tylenol #3: Take one or two tablets every four hours as needed for pain.  Do not exceed 8 (eight) tablets each day  Do not take Norco/Tylenol #3, if you do not have pain.    Over-The-Counter Medication  Non-prescription anti-inflammatory medications can also help to ease the pain.  You can take Aleve or ibuprofen   Take as directed on the bottle  Drink a full glass of water with the medication    Home Medication  Resume your home medications as  instructed    Diet  Resume your normal diet    Activity  No strenuous activity  You can go up and down the stairs as tolerated.  Use common sense  You cannot return to work, if your work requires strenuous activity with your arms or hands.  You cannot return to sports or physical exercise until you have received medical permission.  Return to Work or School  You can return to work in 2-3, if your work does not involve strenuous activity with your arms or hands  You can return to school in 2-3 days but not gym class for 4-6 weeks.    Driving  Do not drive, if you are wearing a splint and/or taking pain medication.        Follow-up Appointment with Dr. Seymour  Call Dr. Seymour's office today for an appointment in 10-14 days.   Verify your appointment date, day, time, and location.  At your 1st postoperative office visit:  Your wounds will be evaluated, healing assessed, and any additional concerns and instructions will be discussed.    Questions or Concerns  Call Dr. Seymour's office if you experience severe pain not controlled by pain medication, swelling, numbness, tingling, bleeding, fever, or other concerns If your call is made after office hours, a physician's assistant or nurse practitioner will be available to help you.  There is always a surgeon covering Dr. Seymour's patients, if he is unavailable.    Chloe  Thank you for coming to [facility] for your operation.  The nurses and the anesthesiologist try very hard to make sure you receive the best care possible.  Your trust in them is greatly appreciated.    Thanks so much,  Dr. Seymour

## 2025-02-28 NOTE — INTERVAL H&P NOTE
Pre-op Diagnosis: OTHER CLOSED EXTRA-ARTICULAR FRACTURE OF DISTAL END OF RIGHT RADIUS, INITIAL ENCOUNTER    The above referenced H&P was reviewed by Kenneth Seymour MD on 2/28/2025, the patient was examined and no significant changes have occurred in the patient's condition since the H&P was performed.  I discussed with the patient and/or legal representative the potential benefits, risks and side effects of this procedure; the likelihood of the patient achieving goals; and potential problems that might occur during recuperation.  I discussed reasonable alternatives to the procedure, including risks, benefits and side effects related to the alternatives and risks related to not receiving this procedure.  We will proceed with procedure as planned.

## 2025-02-28 NOTE — ANESTHESIA PREPROCEDURE EVALUATION
PRE-OP EVALUATION    Patient Name: Chloe Morejon    Admit Diagnosis: OTHER CLOSED EXTRA-ARTICULAR FRACTURE OF DISTAL END OF RIGHT RADIUS, INITIAL ENCOUNTER    Pre-op Diagnosis: OTHER CLOSED EXTRA-ARTICULAR FRACTURE OF DISTAL END OF RIGHT RADIUS, INITIAL ENCOUNTER    OPEN REDUCTION INTERNAL FIXATION RIGHT DISTAL RADIUS    Anesthesia Procedure: OPEN REDUCTION INTERNAL FIXATION RIGHT DISTAL RADIUS (Right: Lower Arm)    Surgeons and Role:     * Kenneth Seymour MD - Primary    Pre-op vitals reviewed.  Temp: 98.5 °F (36.9 °C)  Pulse: 87  Resp: 18  BP: 116/66  SpO2: 98 %  Body mass index is 20.52 kg/m².    Current medications reviewed.  Hospital Medications:   lactated ringers infusion   Intravenous Continuous    scopolamine (Transderm-Scop) 1 MG/3DAYS patch 1 patch  1 patch Transdermal Once    ceFAZolin (Ancef) 2g in 10mL IV syringe premix  2 g Intravenous Once       Outpatient Medications:   Prescriptions Prior to Admission[1]    Allergies: Patient has no known allergies.      Anesthesia Evaluation    Patient summary reviewed.    Anesthetic Complications  (-) history of anesthetic complications         GI/Hepatic/Renal      (+) GERD          (+) liver disease                 Cardiovascular        Exercise tolerance: good     MET: >4                                           Endo/Other    Negative endo/other ROS.                              Pulmonary    Negative pulmonary ROS.                       Neuro/Psych          (+) CVA                    Patient Active Problem List:     Alcoholic cirrhosis of liver with ascites (HCC)     Ascites due to alcoholic cirrhosis (HCC)     Secondary esophageal varices with bleeding (HCC)     Hepatic encephalopathy (HCC)            Past Surgical History:   Procedure Laterality Date    Ir tips procedure  2017    Other ambl surg  5/2013    liver bypass- [facility]    Other ambl surg  8 th grade    wisdom teeth     Social History     Socioeconomic History    Marital status:     Occupational History    Occupation: sales and marketing   Tobacco Use    Smoking status: Never    Smokeless tobacco: Never   Vaping Use    Vaping status: Never Used   Substance and Sexual Activity    Alcohol use: No    Drug use: No    Sexual activity: Yes     Partners: Male     Birth control/protection: Condom     Comment: prior pills   Other Topics Concern    Exercise Yes     History   Drug Use No     Available pre-op labs reviewed.     Lab Results   Component Value Date     02/26/2025    K 4.0 02/26/2025     02/26/2025    CO2 24.0 02/26/2025    BUN 12 02/26/2025    CREATSERUM 0.85 02/26/2025     (H) 02/26/2025    CA 9.6 02/26/2025            Airway      Mallampati: II  Mouth opening: <3 FB  TM distance: < 4 cm  Neck ROM: full Cardiovascular    Cardiovascular exam normal.         Dental    Dentition appears grossly intact         Pulmonary    Pulmonary exam normal.                 Other findings              ASA: 3   Plan: general  NPO status verified and patient meets guidelines.    Post-procedure pain management plan discussed with surgeon and patient.  Surgeon requests: regional block  Comment: Discussed risks and benefits of GA, block for post-op analgesia (INR 1.4) including sore throat, allergy, nausea, vomiting, dental trauma, pain management modalities, transfusion if needed, etc. Questions answered and options explained. Patient accepts and wishes to proceed. The consent was signed without further questions.    Plan/risks discussed with: patient                Present on Admission:  **None**             [1]   Medications Prior to Admission   Medication Sig Dispense Refill Last Dose/Taking    gabapentin 300 MG Oral Cap Take 1 capsule (300 mg total) by mouth as needed.   2/27/2025 Noon    spironolactone 25 MG Oral Tab Take 2 tablets (50 mg total) by mouth daily. 1.5 TABLETS/DAY   2/27/2025 Morning    HYDROcodone-acetaminophen 5-325 MG Oral Tab Take 1 tablet by mouth every 6 (six) hours  as needed for Pain.   2/28/2025    rifaximin (XIFAXAN) 550 MG Oral Tab Take 1 tablet (550 mg total) by mouth 2 (two) times daily. (Patient taking differently: Take 1 tablet (550 mg total) by mouth 2 (two) times daily. For liver disease) 180 tablet 1 2/28/2025 Morning    FUROSEMIDE 20 MG Oral Tab TAKE 1 TABLET BY MOUTH TWICE DAILY 180 tablet 1 2/27/2025 Morning    LACTULOSE 10 GM/15ML Oral Solution TAKE 45 ML BY MOUTH TWICE DAILY 8100 mL 1 2/27/2025 Bedtime    pantoprazole 40 MG Oral Tab EC Take 1 tablet (40 mg total) by mouth before breakfast. 90 tablet 2 2/28/2025 Morning    levetiracetam 750 MG Oral Tab Take 500 mg by mouth daily.   2/28/2025 Morning    folic acid 1 MG Oral Tab Take 1 tablet (1 mg total) by mouth daily. 90 tablet 3 2/28/2025 Morning    lactulose (GENERLAC) 10 GM/15ML Oral Solution Take 45 mL (30 g total) by mouth 2 (two) times daily. 2700 mL 5 2/27/2025 Bedtime    Multiple Vitamin (MULTI-VITAMIN OR) Take  by mouth.   Past Month    Ascorbic Acid (VITAMIN C OR) Take  by mouth.   Past Month

## (undated) DEVICE — MATERIAL SPLNT 3X12IN POLY CMFRT PRE CUT

## (undated) DEVICE — PEG BNE FIX 2X16MM DST RAD TI SMTH
Type: IMPLANTABLE DEVICE | Site: RADIUS | Status: NON-FUNCTIONAL
Removed: 2025-02-28

## (undated) DEVICE — C-ARM: Brand: UNBRANDED

## (undated) DEVICE — GOWN SUR 2XL LEV 4 BLU W/ WHT V NK BND AERO

## (undated) DEVICE — BIT DRL 2X40MM SLD SIDE CUT FOR GEMINUS

## (undated) DEVICE — PADDING CAST 4INX4YD 100% COT SFT SLF BOND

## (undated) DEVICE — PADDING CAST 3INX4YD 100% COT ABSRB HIGHLY

## (undated) DEVICE — SLING MEDIUM 15X8.5IN 32IN

## (undated) DEVICE — GLOVE SUR 7.5 SENSICARE PI PIP CRM PWD F

## (undated) DEVICE — SLEEVE COMPR MD KNEE LEN SGL USE KENDALL SCD

## (undated) DEVICE — GUIDE SUR DIA1.5MM AIMING FOR GEMINUS VOLAR

## (undated) DEVICE — DISPOSABLE BIPOLAR FORCEPS 4" (10.2CM) JEWELERS, STRAIGHT 0.4MM TIP AND 12 FT. (3.6M) CABLE: Brand: KIRWAN

## (undated) DEVICE — GLOVE SUR 8 SENSICARE PI PIP CRM PWD F

## (undated) DEVICE — BIT DRL 2.5X40MM DORS SPANNING PLT DST

## (undated) DEVICE — UPPER EXTREMITY CDS-LF: Brand: MEDLINE INDUSTRIES, INC.

## (undated) DEVICE — ANTIBACTERIAL UNDYED BRAIDED (POLYGLACTIN 910), SYNTHETIC ABSORBABLE SUTURE: Brand: COATED VICRYL

## (undated) DEVICE — SOLUTION IRRIG 1000ML 0.9% NACL USP BTL

## (undated) DEVICE — DISPOSABLE TOURNIQUET CUFF SINGLE BLADDER, DUAL PORT AND QUICK CONNECT CONNECTOR: Brand: COLOR CUFF

## (undated) DEVICE — SYRINGE BULB 50/CS 48/PLT: Brand: MEDEGEN MEDICAL PRODUCTS, LLC

## (undated) DEVICE — SUT ETHLN 4-0 18IN NABSRB BLK 19MM PS

## (undated) NOTE — LETTER
[address]  [address]  Authorization for Surgical Operation and Procedure     Date:___________                                                                                                         Time:__________  I hereby authorize Surgeon(s):  Kenneth Seymour MD, my physician and his/her assistants (if applicable), which may include medical students, residents, and/or fellows, to perform the following surgical operation/ procedure and administer such anesthesia as may be determined necessary by my physician:  Operation/Procedure name (s) Procedure(s):  OPEN REDUCTION INTERNAL FIXATION RIGHT DISTAL RADIUS on Chloe Morejon   2.   I recognize that during the surgical operation/procedure, unforeseen conditions may necessitate additional or different procedures than those listed above.  I, therefore, further authorize and request that the above-named surgeon, assistants, or designees perform such procedures as are, in their judgment, necessary and desirable.    3.   My surgeon/physician has discussed prior to my surgery the potential benefits, risks and side effects of this procedure; the likelihood of achieving goals; and potential problems that might occur during recuperation.  They also discussed reasonable alternatives to the procedure, including risks, benefits, and side effects related to the alternatives and risks related to not receiving this procedure.  I have had all my questions answered and I acknowledge that no guarantee has been made as to the result that may be obtained.    4.   Should the need arise during my operation/procedure, which includes change of level of care prior to discharge, I also consent to the administration of blood and/or blood products.  Further, I understand that despite careful testing and screening of blood or blood products by collecting agencies, I may still be subject to ill effects as a result of receiving a blood transfusion  and/or blood products.  The following are some, but not all, of the potential risks that can occur: fever and allergic reactions, hemolytic reactions, transmission of diseases such as Hepatitis, AIDS and Cytomegalovirus (CMV) and fluid overload.  In the event that I wish to have an autologous transfusion of my own blood, or a directed donor transfusion, I will discuss this with my physician.  Check only if Refusing Blood or Blood Products  I understand refusal of blood or blood products as deemed necessary by my physician may have serious consequences to my condition to include possible death. I hereby assume responsibility for my refusal and release the hospital, its personnel, and my physicians from any responsibility for the consequences of my refusal.          o  Refuse      5.   I authorize the use of any specimen, organs, tissues, body parts or foreign objects that may be removed from my body during the operation/procedure for diagnosis, research or teaching purposes and their subsequent disposal by hospital authorities.  I also authorize the release of specimen test results and/or written reports to my treating physician on the hospital medical staff or other referring or consulting physicians involved in my care, at the discretion of the Pathologist or my treating physician.    6.   I consent to the photographing or videotaping of the operations or procedures to be performed, including appropriate portions of my body for medical, scientific, or educational purposes, provided my identity is not revealed by the pictures or by descriptive texts accompanying them.  If the procedure has been photographed/videotaped, the surgeon will obtain the original picture, image, videotape or CD.  The hospital will not be responsible for storage, release or maintenance of the picture, image, tape or CD.    7.   I consent to the presence of a  or observers in the operating room as deemed necessary by my  physician or their designees.    8.   I recognize that in the event my procedure results in extended X-Ray/fluoroscopy time, I may develop a skin reaction.    9. If I have a Do Not Attempt Resuscitation (DNAR) order in place, that status will be suspended while in the operating room, procedural suite, and during the recovery period unless otherwise explicitly stated by me (or a person authorized to consent on my behalf). The surgeon or my attending physician will determine when the applicable recovery period ends for purposes of reinstating the DNAR order.  10. Patients having a sterilization procedure: I understand that if the procedure is successful the results will be permanent and it will therefore be impossible for me to inseminate, conceive, or bear children.  I also understand that the procedure is intended to result in sterility, although the result has not been guaranteed.   11. I acknowledge that my physician has explained sedation/analgesia administration to me including the risk and benefits I consent to the administration of sedation/analgesia as may be necessary or desirable in the judgment of my physician.    I CERTIFY THAT I HAVE READ AND FULLY UNDERSTAND THE ABOVE CONSENT TO OPERATION and/or OTHER PROCEDURE.    _________________________________________  __________________________________  Signature of Patient     Signature of Responsible Person         ___________________________________         Printed Name of Responsible Person           _________________________________                 Relationship to Patient  _________________________________________  ______________________________  Signature of Witness          Date  Time      Patient Name: Chloe Byrnes Reidsamantha     : 1975                 Printed: 2025     Medical Record #: [id number]                     Page 1 of 2                                    [address]  [address]    Consent for  Anesthesia    I, Chloe Morejon agree to be cared for by an anesthesiologist, who is specially trained to monitor me and give me medicine to put me to sleep or keep me comfortable during my procedure    I understand that my anesthesiologist is not an employee or agent of [facility] or [employer]. He or she works for [employer].    As the patient asking for anesthesia services, I agree to:  Allow the anesthesiologist (anesthesia doctor) to give me medicine and do additional procedures as necessary. Some examples are: Starting or using an “IV” to give me medicine, fluids or blood during my procedure, and having a breathing tube placed to help me breathe when I’m asleep (intubation). In the event that my heart stops working properly, I understand that my anesthesiologist will make every effort to sustain my life, unless otherwise directed by [facility] Do Not Resuscitate documents.  Tell my anesthesia doctor before my procedure:  If I am pregnant.  The last time that I ate or drank.  All of the medicines I take (including prescriptions, herbal supplements, and pills I can buy without a prescription (including street drugs/illegal medications). Failure to inform my anesthesiologist about these medicines may increase my risk of anesthetic complications.  If I am allergic to anything or have had a reaction to anesthesia before.  I understand how the anesthesia medicine will help me (benefits).  I understand that with any type of anesthesia medicine there are risks:  The most common risks are: nausea, vomiting, sore throat, muscle soreness, damage to my eyes, mouth, or teeth (from breathing tube placement).  Rare risks include: remembering what happened during my procedure, allergic reactions to medications, injury to my airway, heart, lungs, vision, nerves, or muscles and in extremely rare instances death.  My doctor has explained to me other choices available to me for my care  (alternatives).  Pregnant Patients (“epidural”):  I understand that the risks of having an epidural (medicine given into my back to help control pain during labor), include itching, low blood pressure, difficulty urinating, headache or slowing of the baby’s heart. Very rare risks include infection, bleeding, seizure, irregular heart rhythms and nerve injury.  Regional Anesthesia (“spinal”, “epidural”, & “nerve blocks”):  I understand that rare but potential complications include headache, bleeding, infection, seizure, irregular heart rhythms, and nerve injury.    I can change my mind about having anesthesia services at any time before I get the medicine.    _____________________________________________________________________________  Patient (or Representative) Signature/Relationship to Patient  Date   Time    _____________________________________________________________________________   Name (if used)    Language/Organization   Time    _____________________________________________________________________________  Anesthesiologist Signature     Date   Time  I have discussed the procedure and information above with the patient (or patient’s representative) and answered their questions. The patient or their representative has agreed to have anesthesia services.    _____________________________________________________________________________  Witness        Date   Time  I have verified that the signature is that of the patient or patient’s representative, and that it was signed before the procedure  Patient Name: Chloe Morejon     : 1975                 Printed: 2025     Medical Record #: [id number]                     Page 2 of 2

## (undated) NOTE — LETTER
OUTSIDE TESTING RESULT REQUEST     IMPORTANT: FOR YOUR IMMEDIATE ATTENTION  Please FAX all test results listed below to: [phone]     Testing already done on or about: 25     * * * * If testing is NOT complete, arrange with patient A.S.A.P. * * * *      Patient Name: Chloe Morejon  Surgery Date: 2025  Medical Record: [id number]  CSN: [id number]  : 1975 - A: 49 y     Sex: female  Surgeon(s):  Kenneth Seymour MD  Procedure: OPEN REDUCTION INTERNAL FIXATION RIGHT DISTAL RADIUS  Anesthesia Type: General     Surgeon: Kenneth Seymour MD     The following Testing and Time Line are REQUIRED PER ANESTHESIA     EKG READ AND SIGNED WITHIN   90 days      Thank You,   Sent by:GUMARO PEREZ